# Patient Record
Sex: FEMALE | HISPANIC OR LATINO | URBAN - NONMETROPOLITAN AREA
[De-identification: names, ages, dates, MRNs, and addresses within clinical notes are randomized per-mention and may not be internally consistent; named-entity substitution may affect disease eponyms.]

---

## 2018-11-29 NOTE — PATIENT DISCUSSION
Recommend Mini lower lift,  platysmaplasty, pre/post-tragel, SMAS to cheeks(discussed risks and benefits of sx. .. ).

## 2018-11-29 NOTE — PATIENT DISCUSSION
This visual field clearly demonstrated a minimum of 49% loss of upper field of vision OU, with upper lid skin in repose and elevated by taping of the lid to demonstrate potential correction. This field shows that taping the lids significantly improved this patient's superior field of vision by approximately 47%, OU.

## 2019-08-08 ENCOUNTER — IMPORTED ENCOUNTER (OUTPATIENT)
Dept: URBAN - NONMETROPOLITAN AREA CLINIC 1 | Facility: CLINIC | Age: 24
End: 2019-08-08

## 2019-08-08 PROCEDURE — S0621 ROUTINE OPHTHALMOLOGICAL EXA: HCPCS

## 2019-08-08 NOTE — PATIENT DISCUSSION
"""*Myopia:.-	  Discussed refractive status with patient in detail. *Gls RX:.-	  A new spectacle prescription was created and dispensed today. NO CL TX TODAY"

## 2020-02-12 ENCOUNTER — IMPORTED ENCOUNTER (OUTPATIENT)
Dept: URBAN - NONMETROPOLITAN AREA CLINIC 1 | Facility: CLINIC | Age: 25
End: 2020-02-12

## 2020-02-12 PROCEDURE — 92310 CONTACT LENS FITTING OU: CPT

## 2020-02-12 PROCEDURE — S0621 ROUTINE OPHTHALMOLOGICAL EXA: HCPCS

## 2022-04-09 ASSESSMENT — VISUAL ACUITY
OD_CC: J1
OS_SC: 20/30
OD_SC: 20/20
OD_SC: 20/40
OS_CC: J1
OS_SC: 20/25
OU_SC: 20/20-

## 2022-04-09 ASSESSMENT — TONOMETRY
OS_IOP_MMHG: 17
OD_IOP_MMHG: 17